# Patient Record
Sex: MALE | Race: WHITE | NOT HISPANIC OR LATINO | Employment: FULL TIME | URBAN - METROPOLITAN AREA
[De-identification: names, ages, dates, MRNs, and addresses within clinical notes are randomized per-mention and may not be internally consistent; named-entity substitution may affect disease eponyms.]

---

## 2023-09-21 ENCOUNTER — OFFICE VISIT (OUTPATIENT)
Dept: OTOLARYNGOLOGY | Facility: CLINIC | Age: 63
End: 2023-09-21
Payer: COMMERCIAL

## 2023-09-21 VITALS — WEIGHT: 209 LBS | HEIGHT: 71 IN | BODY MASS INDEX: 29.26 KG/M2

## 2023-09-21 DIAGNOSIS — H81.10 BPPV (BENIGN PAROXYSMAL POSITIONAL VERTIGO), UNSPECIFIED LATERALITY: ICD-10-CM

## 2023-09-21 DIAGNOSIS — J34.3 NASAL TURBINATE HYPERTROPHY: ICD-10-CM

## 2023-09-21 DIAGNOSIS — Z78.9 INTOLERANCE OF CONTINUOUS POSITIVE AIRWAY PRESSURE (CPAP) VENTILATION: ICD-10-CM

## 2023-09-21 DIAGNOSIS — G47.33 OSA (OBSTRUCTIVE SLEEP APNEA): Primary | ICD-10-CM

## 2023-09-21 DIAGNOSIS — R09.81 NASAL CONGESTION: ICD-10-CM

## 2023-09-21 PROCEDURE — 99204 OFFICE O/P NEW MOD 45 MIN: CPT | Mod: S$GLB,,, | Performed by: STUDENT IN AN ORGANIZED HEALTH CARE EDUCATION/TRAINING PROGRAM

## 2023-09-21 PROCEDURE — 3008F BODY MASS INDEX DOCD: CPT | Mod: CPTII,S$GLB,, | Performed by: STUDENT IN AN ORGANIZED HEALTH CARE EDUCATION/TRAINING PROGRAM

## 2023-09-21 PROCEDURE — 4010F ACE/ARB THERAPY RXD/TAKEN: CPT | Mod: CPTII,S$GLB,, | Performed by: STUDENT IN AN ORGANIZED HEALTH CARE EDUCATION/TRAINING PROGRAM

## 2023-09-21 PROCEDURE — 99204 PR OFFICE/OUTPT VISIT, NEW, LEVL IV, 45-59 MIN: ICD-10-PCS | Mod: S$GLB,,, | Performed by: STUDENT IN AN ORGANIZED HEALTH CARE EDUCATION/TRAINING PROGRAM

## 2023-09-21 PROCEDURE — 1159F PR MEDICATION LIST DOCUMENTED IN MEDICAL RECORD: ICD-10-PCS | Mod: CPTII,S$GLB,, | Performed by: STUDENT IN AN ORGANIZED HEALTH CARE EDUCATION/TRAINING PROGRAM

## 2023-09-21 PROCEDURE — 4010F PR ACE/ARB THEARPY RXD/TAKEN: ICD-10-PCS | Mod: CPTII,S$GLB,, | Performed by: STUDENT IN AN ORGANIZED HEALTH CARE EDUCATION/TRAINING PROGRAM

## 2023-09-21 PROCEDURE — 99999 PR PBB SHADOW E&M-NEW PATIENT-LVL IV: ICD-10-PCS | Mod: PBBFAC,,, | Performed by: STUDENT IN AN ORGANIZED HEALTH CARE EDUCATION/TRAINING PROGRAM

## 2023-09-21 PROCEDURE — 99999 PR PBB SHADOW E&M-NEW PATIENT-LVL IV: CPT | Mod: PBBFAC,,, | Performed by: STUDENT IN AN ORGANIZED HEALTH CARE EDUCATION/TRAINING PROGRAM

## 2023-09-21 PROCEDURE — 3008F PR BODY MASS INDEX (BMI) DOCUMENTED: ICD-10-PCS | Mod: CPTII,S$GLB,, | Performed by: STUDENT IN AN ORGANIZED HEALTH CARE EDUCATION/TRAINING PROGRAM

## 2023-09-21 PROCEDURE — 1159F MED LIST DOCD IN RCRD: CPT | Mod: CPTII,S$GLB,, | Performed by: STUDENT IN AN ORGANIZED HEALTH CARE EDUCATION/TRAINING PROGRAM

## 2023-09-21 RX ORDER — BLOOD-GLUCOSE METER
1 EACH MISCELLANEOUS
COMMUNITY
Start: 2023-09-15

## 2023-09-21 RX ORDER — MIRTAZAPINE 15 MG/1
15 TABLET, FILM COATED ORAL NIGHTLY
COMMUNITY
Start: 2023-08-20

## 2023-09-21 RX ORDER — ASPIRIN 81 MG/1
81 TABLET ORAL
COMMUNITY

## 2023-09-21 RX ORDER — FLUTICASONE PROPIONATE 50 MCG
1 SPRAY, SUSPENSION (ML) NASAL 2 TIMES DAILY
Qty: 16 G | Refills: 11 | Status: SHIPPED | OUTPATIENT
Start: 2023-09-21

## 2023-09-21 RX ORDER — ZOSTER VACCINE RECOMBINANT, ADJUVANTED 50 MCG/0.5
50 KIT INTRAMUSCULAR
COMMUNITY
Start: 2022-09-27

## 2023-09-21 RX ORDER — LANCETS 33 GAUGE
EACH MISCELLANEOUS
COMMUNITY
Start: 2023-09-18

## 2023-09-21 RX ORDER — SITAGLIPTIN AND METFORMIN HYDROCHLORIDE 1000; 50 MG/1; MG/1
1 TABLET, FILM COATED ORAL 2 TIMES DAILY
COMMUNITY
Start: 2023-09-06

## 2023-09-21 RX ORDER — GLIMEPIRIDE 4 MG/1
4 TABLET ORAL
COMMUNITY
Start: 2023-07-27

## 2023-09-21 RX ORDER — LOSARTAN POTASSIUM 25 MG/1
25 TABLET ORAL
COMMUNITY
Start: 2023-09-06

## 2023-09-21 RX ORDER — MONTELUKAST SODIUM 10 MG/1
10 TABLET ORAL
COMMUNITY
Start: 2023-09-06

## 2023-09-21 RX ORDER — AMITRIPTYLINE HYDROCHLORIDE 25 MG/1
25-50 TABLET, FILM COATED ORAL NIGHTLY
COMMUNITY
Start: 2023-08-20

## 2023-09-21 RX ORDER — LANCETS
EACH MISCELLANEOUS
COMMUNITY
Start: 2023-09-18

## 2023-09-21 RX ORDER — METRONIDAZOLE 500 MG/1
500 TABLET ORAL 2 TIMES DAILY
COMMUNITY
Start: 2023-05-10 | End: 2024-01-24 | Stop reason: ALTCHOICE

## 2023-09-21 NOTE — PROGRESS NOTES
Otolaryngology Clinic Note    Subjective:       Patient ID: Jostin Singleton is a 62 y.o. male.    Chief Complaint: inspire consult      History of Present Illness: Jostin Singleton is a 62 y.o. male presenting with IMELDA with CPAP intolerance. Has  tried nasal pillows, nasal mask and face, only mildly tolerated nasal pillows. Rolls around and will pull off. Not wearing at all. Does not like cleanliness concerns with machine.   Last study was more than 5 years ago. Travels a lot and does not like machine. No significant weight change, maybe 10 lbs less than prior.   Does have nasal congestion at baseline. Used to use flonase- 3 years total, not sure if it helped. Takes zyrtec and singulair. Used to have allergy issues, but not recent. No rhinorrhea, no pressure. Eyes burn after cutting grass. Not sure if he needs meds. Nose is very dry. Been using vicks. No ENT surgeries. Also reports BBPV to left, flaring lately. Last maybe 2 weeks ago.     ESS 13.       Past Surgical History:   Procedure Laterality Date    knee scope      TRIGGER FINGER RELEASE       Past Medical History:   Diagnosis Date    Diabetes mellitus     Dizziness     Hypertension      Social Determinants of Health     Tobacco Use: Unknown (9/21/2023)    Patient History     Smoking Tobacco Use: Never     Smokeless Tobacco Use: Unknown     Passive Exposure: Not on file   Alcohol Use: Not on file   Financial Resource Strain: Not on file   Food Insecurity: Not on file   Transportation Needs: Not on file   Physical Activity: Not on file   Stress: Not on file   Social Connections: Not on file   Housing Stability: Not on file   Depression: Not on file     Review of patient's allergies indicates:   Allergen Reactions    Codeine Nausea Only, Other (See Comments) and Nausea And Vomiting     sweats  Patient tolerated percocet     Current Outpatient Medications   Medication Instructions    alprazolam ODT (NIRAVAM) 0.5 MG TbDL No dose, route, or frequency recorded.     amitriptyline (ELAVIL) 25-50 mg, Oral, Nightly    aspirin (ECOTRIN) 81 mg, Oral    blood sugar diagnostic Strp 1 strip, Other    DOCOSAHEXAENOIC ACID ORAL 2 g, Oral    fluticasone propionate (FLONASE) 50 mcg, Each Nostril, 2 times daily    glimepiride (AMARYL) 4 mg, Oral    JANUMET 50-1,000 mg per tablet 1 tablet, Oral, 2 times daily    lancets Misc Check glucose daily. One Touch Verio Flex lancets.    losartan (COZAAR) 25 mg, Oral    metFORMIN (GLUCOPHAGE) 500 mg, Oral, 2 times daily    metoprolol succinate (TOPROL-XL) 25 mg, 2 times daily    metroNIDAZOLE (FLAGYL) 500 mg, Oral, 2 times daily    mirtazapine (REMERON) 15 mg, Oral, Nightly    montelukast (SINGULAIR) 10 mg, Oral    ONETOUCH DELICA PLUS LANCET 33 gauge Misc Topical (Top)    ONETOUCH VERIO TEST STRIPS Strp 1 strip    varicella-zoster [id number]-AS01B, PF, (SHINGRIX, PF,) 50 mcg/0.5 mL injection 50 mcg, Intramuscular         ENT ROS negative except as stated above.     Patient answers are not available for this visit.            Objective:      There were no vitals filed for this visit.    General: NAD, well appearing  Eyes: Normal conjunctiva and lids  Face: symmetric, nerve intact  Nose: The nose is without any evidence of any deformity. The nasal mucosa is moist. The septum is midline, thick crest on right. There is no evidence of septal hematoma. The turbinates are moderately large.   Ears: The ears are with normal-appearing pinna. Examination of the canals is normal appearing bilaterally. There is no drainage or erythema noted. The tympanic membranes are normal appearing with pearly color, normal-appearing landmarks and normal light reflex. Hearing is grossly intact.  Mouth: No obvious abnormalities to the lips. The teeth are unremarkable. The gingivae are without any obvious evidence of infection or lesion. The oral mucosa is moist and pink. There are no obvious masses to the hard or soft palate.   Oropharynx: The uvula is midline.  The tongue is midline.  The posterior pharynx is without erythema or exudate. The tonsils are normal appearing.  Salivary glands: The salivary glands are symmetric and not enlarged, no masses  Neck: No lymphadenopathy, trachea midline, thryoid not enlarged.  Psych: Normal mood and affect.   Neuro: Grossly intact  Speech: fluent  Monty hallpike- ? Mild to right, negative to left, did not happen on repeat.        Assessment and Plan:       1. IMELDA (obstructive sleep apnea)    2. Intolerance of continuous positive airway pressure (CPAP) ventilation    3. BPPV (benign paroxysmal positional vertigo), unspecified laterality    4. Nasal congestion    5. Nasal turbinate hypertrophy          Reviewed Inspire criteria. Meets weight. Needs new sleep study, HSAT ordered.   Will do DISE after with turbinate reduction. I discussed the risks of turbinate reduction including persistent issues, bleeding, smell changes, tearing abnormalities. Flonase for now.   Can stop singulair, can stop zyrtec if not helping.     Glucose high in AM, concerned for long term issues from untreated IMELDA.     BPPV - scott-daroff if recurs, or epley to side of concern.       RTC: will plan for DISE after HSAT    Plan of care was discussed in detail with the patient, who agreed with the plan as above. All questions were answered in detail.     Citlaly Nieto MD  Otolaryngology

## 2023-09-21 NOTE — PATIENT INSTRUCTIONS
Try lesvia or epley to side of concern if vertigo comes back.     I will contact after sleep study to plan for sleep endoscopy if sleep apnea is in the range for inspire.

## 2023-10-02 ENCOUNTER — PATIENT MESSAGE (OUTPATIENT)
Dept: OTOLARYNGOLOGY | Facility: CLINIC | Age: 63
End: 2023-10-02
Payer: COMMERCIAL

## 2023-10-02 ENCOUNTER — PATIENT MESSAGE (OUTPATIENT)
Dept: ADMINISTRATIVE | Facility: OTHER | Age: 63
End: 2023-10-02
Payer: COMMERCIAL

## 2023-10-05 ENCOUNTER — PATIENT MESSAGE (OUTPATIENT)
Dept: OTOLARYNGOLOGY | Facility: CLINIC | Age: 63
End: 2023-10-05
Payer: COMMERCIAL

## 2023-10-05 DIAGNOSIS — H81.10 BPPV (BENIGN PAROXYSMAL POSITIONAL VERTIGO), UNSPECIFIED LATERALITY: Primary | ICD-10-CM

## 2023-10-05 NOTE — TELEPHONE ENCOUNTER
I'm placing referral that can be sent to the place in [place]. I can also see him tomorrow to try to reposition if he would like or if he can tell which ear it is (was left prior I think), he can do epley at home and look up videos or instructions online.

## 2023-10-24 ENCOUNTER — PATIENT MESSAGE (OUTPATIENT)
Dept: OTOLARYNGOLOGY | Facility: CLINIC | Age: 63
End: 2023-10-24
Payer: COMMERCIAL

## 2023-11-13 ENCOUNTER — PATIENT MESSAGE (OUTPATIENT)
Dept: OTOLARYNGOLOGY | Facility: CLINIC | Age: 63
End: 2023-11-13
Payer: COMMERCIAL

## 2023-11-13 DIAGNOSIS — J34.3 NASAL TURBINATE HYPERTROPHY: ICD-10-CM

## 2023-11-13 DIAGNOSIS — R09.81 NASAL CONGESTION: ICD-10-CM

## 2023-11-13 DIAGNOSIS — G47.33 OSA (OBSTRUCTIVE SLEEP APNEA): Primary | ICD-10-CM

## 2023-11-13 DIAGNOSIS — Z78.9 INTOLERANCE OF CONTINUOUS POSITIVE AIRWAY PRESSURE (CPAP) VENTILATION: ICD-10-CM

## 2023-11-13 NOTE — TELEPHONE ENCOUNTER
Case request placed for dise and turb trim. He is out of town until mid dec. May book for then or next year. 2 week follow up for nose debridement. Thank you!

## 2023-12-11 ENCOUNTER — TELEPHONE (OUTPATIENT)
Dept: OTOLARYNGOLOGY | Facility: CLINIC | Age: 63
End: 2023-12-11
Payer: COMMERCIAL

## 2023-12-11 NOTE — TELEPHONE ENCOUNTER
----- Message from Jeanette Morris sent at 12/11/2023  2:11 PM CST -----  Regarding: Needs to reschedule  Type: Needs Medical Advice  Who Called:  Jostin Deng Call Back Number: [phone]    Additional Information: Pt was hoping to reschedule his procedure to 1/16 instead of 12/26 please earlene

## 2024-01-24 RX ORDER — MULTIVITAMIN
1 TABLET ORAL DAILY
COMMUNITY

## 2024-01-24 RX ORDER — ATORVASTATIN CALCIUM 20 MG/1
20 TABLET, FILM COATED ORAL DAILY
COMMUNITY

## 2024-01-24 RX ORDER — SEMAGLUTIDE 1.34 MG/ML
0.25 INJECTION, SOLUTION SUBCUTANEOUS
COMMUNITY

## 2024-01-26 ENCOUNTER — ANESTHESIA EVENT (OUTPATIENT)
Dept: SURGERY | Facility: HOSPITAL | Age: 64
End: 2024-01-26
Payer: COMMERCIAL

## 2024-01-29 ENCOUNTER — TELEPHONE (OUTPATIENT)
Dept: OTOLARYNGOLOGY | Facility: CLINIC | Age: 64
End: 2024-01-29
Payer: COMMERCIAL

## 2024-01-29 ENCOUNTER — HOSPITAL ENCOUNTER (OUTPATIENT)
Facility: HOSPITAL | Age: 64
Discharge: HOME | End: 2024-01-29
Attending: STUDENT IN AN ORGANIZED HEALTH CARE EDUCATION/TRAINING PROGRAM | Admitting: STUDENT IN AN ORGANIZED HEALTH CARE EDUCATION/TRAINING PROGRAM
Payer: COMMERCIAL

## 2024-01-29 ENCOUNTER — ANESTHESIA (OUTPATIENT)
Dept: SURGERY | Facility: HOSPITAL | Age: 64
End: 2024-01-29
Payer: COMMERCIAL

## 2024-01-29 VITALS
WEIGHT: 201 LBS | SYSTOLIC BLOOD PRESSURE: 176 MMHG | TEMPERATURE: 97 F | HEART RATE: 57 BPM | BODY MASS INDEX: 28.14 KG/M2 | DIASTOLIC BLOOD PRESSURE: 90 MMHG | HEIGHT: 71 IN | OXYGEN SATURATION: 97 % | RESPIRATION RATE: 11 BRPM

## 2024-01-29 DIAGNOSIS — R09.81 NASAL CONGESTION: ICD-10-CM

## 2024-01-29 DIAGNOSIS — Z78.9 INTOLERANCE OF CONTINUOUS POSITIVE AIRWAY PRESSURE (CPAP) VENTILATION: ICD-10-CM

## 2024-01-29 DIAGNOSIS — J34.3 NASAL TURBINATE HYPERTROPHY: ICD-10-CM

## 2024-01-29 DIAGNOSIS — G47.33 OSA (OBSTRUCTIVE SLEEP APNEA): Primary | ICD-10-CM

## 2024-01-29 LAB — GLUCOSE SERPL-MCNC: 227 MG/DL (ref 70–110)

## 2024-01-29 PROCEDURE — 63600175 PHARM REV CODE 636 W HCPCS: Mod: PO | Performed by: ANESTHESIOLOGY

## 2024-01-29 PROCEDURE — 30140 RESECT INFERIOR TURBINATE: CPT | Mod: 50,,, | Performed by: STUDENT IN AN ORGANIZED HEALTH CARE EDUCATION/TRAINING PROGRAM

## 2024-01-29 PROCEDURE — 25000003 PHARM REV CODE 250: Mod: PO | Performed by: STUDENT IN AN ORGANIZED HEALTH CARE EDUCATION/TRAINING PROGRAM

## 2024-01-29 PROCEDURE — 71000015 HC POSTOP RECOV 1ST HR: Mod: PO | Performed by: STUDENT IN AN ORGANIZED HEALTH CARE EDUCATION/TRAINING PROGRAM

## 2024-01-29 PROCEDURE — 27201423 OPTIME MED/SURG SUP & DEVICES STERILE SUPPLY: Mod: PO | Performed by: STUDENT IN AN ORGANIZED HEALTH CARE EDUCATION/TRAINING PROGRAM

## 2024-01-29 PROCEDURE — D9220A PRA ANESTHESIA: Mod: ANES,,, | Performed by: ANESTHESIOLOGY

## 2024-01-29 PROCEDURE — 25000003 PHARM REV CODE 250: Mod: PO | Performed by: ANESTHESIOLOGY

## 2024-01-29 PROCEDURE — 63600175 PHARM REV CODE 636 W HCPCS: Mod: PO | Performed by: NURSE ANESTHETIST, CERTIFIED REGISTERED

## 2024-01-29 PROCEDURE — 37000008 HC ANESTHESIA 1ST 15 MINUTES: Mod: PO | Performed by: STUDENT IN AN ORGANIZED HEALTH CARE EDUCATION/TRAINING PROGRAM

## 2024-01-29 PROCEDURE — 37000009 HC ANESTHESIA EA ADD 15 MINS: Mod: PO | Performed by: STUDENT IN AN ORGANIZED HEALTH CARE EDUCATION/TRAINING PROGRAM

## 2024-01-29 PROCEDURE — 36000709 HC OR TIME LEV III EA ADD 15 MIN: Mod: PO | Performed by: STUDENT IN AN ORGANIZED HEALTH CARE EDUCATION/TRAINING PROGRAM

## 2024-01-29 PROCEDURE — 25000003 PHARM REV CODE 250: Mod: PO | Performed by: NURSE ANESTHETIST, CERTIFIED REGISTERED

## 2024-01-29 PROCEDURE — 36000708 HC OR TIME LEV III 1ST 15 MIN: Mod: PO | Performed by: STUDENT IN AN ORGANIZED HEALTH CARE EDUCATION/TRAINING PROGRAM

## 2024-01-29 PROCEDURE — 63600175 PHARM REV CODE 636 W HCPCS: Mod: PO | Performed by: STUDENT IN AN ORGANIZED HEALTH CARE EDUCATION/TRAINING PROGRAM

## 2024-01-29 PROCEDURE — 82962 GLUCOSE BLOOD TEST: CPT | Mod: PO | Performed by: STUDENT IN AN ORGANIZED HEALTH CARE EDUCATION/TRAINING PROGRAM

## 2024-01-29 PROCEDURE — 42975 DISE EVAL SLP DO BRTH FLX DX: CPT | Mod: 51,,, | Performed by: STUDENT IN AN ORGANIZED HEALTH CARE EDUCATION/TRAINING PROGRAM

## 2024-01-29 PROCEDURE — D9220A PRA ANESTHESIA: Mod: CRNA,,, | Performed by: NURSE ANESTHETIST, CERTIFIED REGISTERED

## 2024-01-29 PROCEDURE — 71000033 HC RECOVERY, INTIAL HOUR: Mod: PO | Performed by: STUDENT IN AN ORGANIZED HEALTH CARE EDUCATION/TRAINING PROGRAM

## 2024-01-29 RX ORDER — OXYCODONE HYDROCHLORIDE 5 MG/1
5 TABLET ORAL ONCE AS NEEDED
Status: ACTIVE | OUTPATIENT
Start: 2024-01-29 | End: 2035-06-27

## 2024-01-29 RX ORDER — ONDANSETRON 4 MG/1
4 TABLET, ORALLY DISINTEGRATING ORAL EVERY 6 HOURS PRN
Qty: 10 TABLET | Refills: 0 | Status: SHIPPED | OUTPATIENT
Start: 2024-01-29

## 2024-01-29 RX ORDER — OXYMETAZOLINE HCL 0.05 %
SPRAY, NON-AEROSOL (ML) NASAL
Status: DISCONTINUED | OUTPATIENT
Start: 2024-01-29 | End: 2024-01-29 | Stop reason: HOSPADM

## 2024-01-29 RX ORDER — LIDOCAINE HYDROCHLORIDE AND EPINEPHRINE 10; 10 MG/ML; UG/ML
INJECTION, SOLUTION INFILTRATION; PERINEURAL
Status: DISCONTINUED | OUTPATIENT
Start: 2024-01-29 | End: 2024-01-29 | Stop reason: HOSPADM

## 2024-01-29 RX ORDER — HYDROCODONE BITARTRATE AND ACETAMINOPHEN 5; 325 MG/1; MG/1
1 TABLET ORAL EVERY 6 HOURS PRN
Qty: 10 TABLET | Refills: 0 | Status: SHIPPED | OUTPATIENT
Start: 2024-01-29 | End: 2024-02-01

## 2024-01-29 RX ORDER — MIDAZOLAM HYDROCHLORIDE 1 MG/ML
INJECTION, SOLUTION INTRAMUSCULAR; INTRAVENOUS
Status: DISCONTINUED | OUTPATIENT
Start: 2024-01-29 | End: 2024-01-29

## 2024-01-29 RX ORDER — FENTANYL CITRATE 50 UG/ML
INJECTION, SOLUTION INTRAMUSCULAR; INTRAVENOUS
Status: DISCONTINUED | OUTPATIENT
Start: 2024-01-29 | End: 2024-01-29

## 2024-01-29 RX ORDER — OXYMETAZOLINE HCL 0.05 %
2 SPRAY, NON-AEROSOL (ML) NASAL
Status: COMPLETED | OUTPATIENT
Start: 2024-01-29 | End: 2024-01-29

## 2024-01-29 RX ORDER — FENTANYL CITRATE 50 UG/ML
25 INJECTION, SOLUTION INTRAMUSCULAR; INTRAVENOUS EVERY 5 MIN PRN
Status: ACTIVE | OUTPATIENT
Start: 2024-01-29

## 2024-01-29 RX ORDER — MUPIROCIN 20 MG/G
OINTMENT TOPICAL
Status: DISCONTINUED | OUTPATIENT
Start: 2024-01-29 | End: 2024-01-29 | Stop reason: HOSPADM

## 2024-01-29 RX ORDER — PROPOFOL 10 MG/ML
VIAL (ML) INTRAVENOUS CONTINUOUS PRN
Status: DISCONTINUED | OUTPATIENT
Start: 2024-01-29 | End: 2024-01-29

## 2024-01-29 RX ORDER — LIDOCAINE HYDROCHLORIDE 10 MG/ML
1 INJECTION, SOLUTION EPIDURAL; INFILTRATION; INTRACAUDAL; PERINEURAL ONCE
Status: COMPLETED | OUTPATIENT
Start: 2024-01-29 | End: 2024-01-29

## 2024-01-29 RX ORDER — EPINEPHRINE 1 MG/ML
INJECTION INTRAMUSCULAR; INTRAVENOUS; SUBCUTANEOUS
Status: DISCONTINUED | OUTPATIENT
Start: 2024-01-29 | End: 2024-01-29 | Stop reason: HOSPADM

## 2024-01-29 RX ORDER — METOCLOPRAMIDE HYDROCHLORIDE 5 MG/ML
10 INJECTION INTRAMUSCULAR; INTRAVENOUS EVERY 10 MIN PRN
Status: ACTIVE | OUTPATIENT
Start: 2024-01-29

## 2024-01-29 RX ORDER — LIDOCAINE HYDROCHLORIDE 20 MG/ML
INJECTION INTRAVENOUS
Status: DISCONTINUED | OUTPATIENT
Start: 2024-01-29 | End: 2024-01-29

## 2024-01-29 RX ORDER — ROCURONIUM BROMIDE 10 MG/ML
INJECTION, SOLUTION INTRAVENOUS
Status: DISCONTINUED | OUTPATIENT
Start: 2024-01-29 | End: 2024-01-29

## 2024-01-29 RX ORDER — KETAMINE HCL IN 0.9 % NACL 50 MG/5 ML
SYRINGE (ML) INTRAVENOUS
Status: DISCONTINUED | OUTPATIENT
Start: 2024-01-29 | End: 2024-01-29

## 2024-01-29 RX ORDER — DEXAMETHASONE SODIUM PHOSPHATE 4 MG/ML
INJECTION, SOLUTION INTRA-ARTICULAR; INTRALESIONAL; INTRAMUSCULAR; INTRAVENOUS; SOFT TISSUE
Status: DISCONTINUED | OUTPATIENT
Start: 2024-01-29 | End: 2024-01-29

## 2024-01-29 RX ORDER — SODIUM CHLORIDE 0.9 % (FLUSH) 0.9 %
3 SYRINGE (ML) INJECTION
Status: SHIPPED | OUTPATIENT
Start: 2024-01-29

## 2024-01-29 RX ORDER — LIDOCAINE HYDROCHLORIDE 40 MG/ML
INJECTION, SOLUTION RETROBULBAR
Status: DISCONTINUED | OUTPATIENT
Start: 2024-01-29 | End: 2024-01-29 | Stop reason: HOSPADM

## 2024-01-29 RX ORDER — ACETAMINOPHEN 10 MG/ML
INJECTION, SOLUTION INTRAVENOUS
Status: DISCONTINUED | OUTPATIENT
Start: 2024-01-29 | End: 2024-01-29

## 2024-01-29 RX ORDER — PROPOFOL 10 MG/ML
VIAL (ML) INTRAVENOUS
Status: DISCONTINUED | OUTPATIENT
Start: 2024-01-29 | End: 2024-01-29

## 2024-01-29 RX ORDER — SODIUM CHLORIDE, SODIUM LACTATE, POTASSIUM CHLORIDE, CALCIUM CHLORIDE 600; 310; 30; 20 MG/100ML; MG/100ML; MG/100ML; MG/100ML
INJECTION, SOLUTION INTRAVENOUS CONTINUOUS
Status: DISPENSED | OUTPATIENT
Start: 2024-01-29

## 2024-01-29 RX ADMIN — GLYCOPYRROLATE 0.2 MG: 0.2 INJECTION INTRAMUSCULAR; INTRAVENOUS at 07:01

## 2024-01-29 RX ADMIN — PROPOFOL 125 MCG/KG/MIN: 10 INJECTION, EMULSION INTRAVENOUS at 07:01

## 2024-01-29 RX ADMIN — PROPOFOL 10 MG: 10 INJECTION, EMULSION INTRAVENOUS at 07:01

## 2024-01-29 RX ADMIN — ACETAMINOPHEN 1000 MG: 10 INJECTION, SOLUTION INTRAVENOUS at 07:01

## 2024-01-29 RX ADMIN — MIDAZOLAM HYDROCHLORIDE 1 MG: 1 INJECTION, SOLUTION INTRAMUSCULAR; INTRAVENOUS at 08:01

## 2024-01-29 RX ADMIN — Medication 2 SPRAY: at 07:01

## 2024-01-29 RX ADMIN — ROCURONIUM BROMIDE 30 MG: 10 INJECTION, SOLUTION INTRAVENOUS at 07:01

## 2024-01-29 RX ADMIN — LIDOCAINE HYDROCHLORIDE 1 MG: 10 INJECTION, SOLUTION EPIDURAL; INFILTRATION; INTRACAUDAL; PERINEURAL at 07:01

## 2024-01-29 RX ADMIN — SUGAMMADEX 200 MG: 100 INJECTION, SOLUTION INTRAVENOUS at 08:01

## 2024-01-29 RX ADMIN — LIDOCAINE HYDROCHLORIDE 30 MG: 20 INJECTION INTRAVENOUS at 07:01

## 2024-01-29 RX ADMIN — FENTANYL CITRATE 50 MCG: 0.05 INJECTION, SOLUTION INTRAMUSCULAR; INTRAVENOUS at 07:01

## 2024-01-29 RX ADMIN — DEXAMETHASONE SODIUM PHOSPHATE 4 MG: 4 INJECTION, SOLUTION INTRAMUSCULAR; INTRAVENOUS at 08:01

## 2024-01-29 RX ADMIN — Medication 20 MG: at 07:01

## 2024-01-29 RX ADMIN — PROPOFOL 30 MG: 10 INJECTION, EMULSION INTRAVENOUS at 07:01

## 2024-01-29 RX ADMIN — SODIUM CHLORIDE, POTASSIUM CHLORIDE, SODIUM LACTATE AND CALCIUM CHLORIDE: 600; 310; 30; 20 INJECTION, SOLUTION INTRAVENOUS at 07:01

## 2024-01-29 NOTE — PLAN OF CARE
VSS, all questions answered. Denies recent fever or illness. Dr. Munoz notified of pts Blood sugar of 227. Pt states ready for procedure.

## 2024-01-29 NOTE — ANESTHESIA PREPROCEDURE EVALUATION
01/29/2024  Jostin Singleton is a 63 y.o., male.      Pre-op Assessment    I have reviewed the Patient Summary Reports.     I have reviewed the Nursing Notes. I have reviewed the NPO Status.   I have reviewed the Medications.     Review of Systems  Anesthesia Hx:  No problems with previous Anesthesia                Social:  Non-Smoker       Cardiovascular:     Hypertension, well controlled                                        Pulmonary:        Sleep Apnea                Renal/:  Renal/ Normal                 Neurological:  Neurology Normal                                      Endocrine:  Diabetes, well controlled, type 2               Physical Exam  General: Well nourished, Cooperative, Alert and Oriented    Airway:  Mallampati: II   Mouth Opening: Normal  TM Distance: Normal  Neck ROM: Normal ROM    Anesthesia Plan  Type of Anesthesia, risks & benefits discussed:    Anesthesia Type: Gen ETT, Gen Supraglottic Airway, Gen Natural Airway, MAC  Intra-op Monitoring Plan: Standard ASA Monitors  Post Op Pain Control Plan: multimodal analgesia  Induction:  IV  Airway Plan: Direct, Video and Fiberoptic, Post-Induction  Informed Consent: Informed consent signed with the Patient and all parties understand the risks and agree with anesthesia plan.  All questions answered.   ASA Score: 3    Ready For Surgery From Anesthesia Perspective.   .

## 2024-01-29 NOTE — TRANSFER OF CARE
"Anesthesia Transfer of Care Note    Patient: Jostin Singleton    Procedure(s) Performed: Procedure(s) (LRB):  SLEEP ENDOSCOPY,DRUG-INDUCED (Bilateral)  REDUCTION, NASAL TURBINATE (Bilateral)    Patient location: PACU    Anesthesia Type: general    Transport from OR: Transported from OR on room air with adequate spontaneous ventilation    Post pain: adequate analgesia    Post assessment: no apparent anesthetic complications and tolerated procedure well    Post vital signs: stable    Level of consciousness: responds to stimulation    Nausea/Vomiting: no nausea/vomiting    Complications: none    Transfer of care protocol was followed      Last vitals: Visit Vitals  BP (!) 173/95 (BP Location: Left arm, Patient Position: Sitting)   Pulse 72   Temp 36.2 °C (97.1 °F) (Skin)   Resp 13   Ht 5' 11" (1.803 m)   Wt 91.2 kg (201 lb)   SpO2 99%   BMI 28.03 kg/m²     "

## 2024-01-29 NOTE — TELEPHONE ENCOUNTER
S/w wife and she states that the pt is allergic to Norco and it is causing him to vomit. Advised wife to have pt d/c Norco and only take Tylenol and Ibuprofen. She verbalized understanding. Advised wife that pt was also given Zofran to help with nausea/vomiting. Wife states pt took them together, advised that he should take Zofran and then wait 20-30 minutes before trying Norco again, if he feels he needs the Norco, she verbalized understanding.

## 2024-01-29 NOTE — ANESTHESIA PROCEDURE NOTES
Intubation    Date/Time: 1/29/2024 7:53 AM    Performed by: Blanquita Carrasco CRNA  Authorized by: Jostin Munoz MD    Intubation:     Induction:  Intravenous    Intubated:  Postinduction    Mask Ventilation:  Easy mask    Attempted By:  CRNA    Method of Intubation:  Video laryngoscopy    Blade:  Burkett 3    Laryngeal View Grade: Grade I - full view of cords      Difficult Airway Encountered?: No      Complications:  None    Airway Device Size:  7.0    Style/Cuff Inflation:  Cuffed (inflated to minimal occlusive pressure)    Inflation Amount (mL):  5    Tube secured:  22    Secured at:  The lips    Placement Verified By:  Capnometry    Complicating Factors:  None    Findings Post-Intubation:  BS equal bilateral and atraumatic/condition of teeth unchanged

## 2024-01-29 NOTE — DISCHARGE INSTRUCTIONS
Post-op Sleep Surgery (Turbinate Reduction)   Citlaly Nieto MD  Otolaryngology - [facility] - [phone]    WHAT TO DO    1. You should follow-up in 2 weeks. My staff will reach out to book this. They will also reach out to schedule you for the inspire surgery.   Please call [phone] to set up a specific time or to change dates.  Call me ASAP if you are experiencing any unexpected problems.  2. Use the recommended medications / treatments as described  3. Familiarize yourself with the information in this pamphlet  4. Call me with questions.       Pain and Activity  You will have ear pain and sore throat for 1-2 weeks. This commonly increased between days 5-7 following surgery as the scabs heal up.  You will have headache and nasal pain. This should generally improve over the first week  Light activity / no strenuous exercise for 2 weeks    Diet  Make sure to get enough fluids and nutrients. Food and drink guidelines include:  Take lots of fluids. Good choices are water, popsicles, and mild juices. Hydration is the MOST IMPORTANT factor in your nutrition during the healing process. Other examples include Boost, Ensure, and electrolyte containing juices)  No diet restrictions. You may want to eat more of a modified diet (with softer foods) but I am fine with you eating anything in your normal diet.  You may want to avoid spicy/acidic and hard foods during this time, strictly for comfort.     Medication  Give only medications approved by your doctor. Follow directions closely when giving your child medications.  Pain medication  You should alternate the pain medications so that you are taking one medication up to every 3 hours. An example is Ibuprofen, then Oxycodone 3 hours later.   Hydrocodone with Acetaminophen - this can be taken every 6 hours as needed for pain. It is usually needed for the first few days regularly. Do not take plain Tylenol (acetaminophen) within 6 hours of this  medication.   You should also take an anti-inflammatory for pain in addition to the narcotic, including Ibuprofen/Advil/Motrin. You can take 800 mg every 6 hours as needed for pain.    Mupirocin - this is a nasal antibiotic that should be applied to the nostrils twice daily for the first 10 days.   In the first few days, it is recommend to give something every 6 hours while you are awake to keep the pain down. You can alternate Motrin and Tylenol OR Motrin and the Oxycodone.     Medication Tips  Avoid fish oil (omega acids) and vitamin E for 1 week.  As your pain lessens, you may Replace doses of prescription pain medications with acetaminophen (Tylenol). However, Do Not take doses of prescription pain medications at the same time as Tylenol because this could cause an overdose of Tylenol.  Do Not drive or operate machinery if taking prescription pain medications  Read each medication label carefully, ask your pharmacist if you have any questions regarding warnings on the label  Do not measure liquid with a kitchen spoon. There are pediatric measuring devices available at the pharmacy. Ask for one when you get your prescription filled.  Store all mediations out of reach of children    Nasal Care:  It is common to experience nasal congestion after surgery. This is due to swelling and scabs. DO NOT BLOW YOUR NOSE until 2 weeks after surgery.  Crusts are essentially scabs and mucus that build up in the nasal passages after surgery.  Crusts eventually resolve. Usually, I will remove some crusts during your postoperative visit.  Nasal saline spray or irrigation helps to soften and remove scabs. Start saline or irrigation NOW  You can use regular spray at least 3 times a day or rinses as below.     Nasal irrigation kits are available over the counter in the nasal section. Common brands include SinuCleanse or NeilMed.     Nasal Irrigations  This will help remove the allergens, debris, and mucous from your nose to help you  breathe. It will also clear it in preparation for other nasal medications.    To perform, purchase an over the counter sinus irrigation kit such as the NeilMed Sinus Rinse Kit. Use as directed on the box. You should use distilled water or water that was previously boiled and left to cool. If you wish, you may make your own solution. However, salt packets are available in the nasal section in your  drug store.     A rough estimate for making salt solution is:  8oz water  2 teaspoons salt (pickling, carlos or Kosher salt)  1 teaspoon baking soda    After each use, rinse the bottle with small amount of rubbing alcohol and clean with soap.  Replace the irrigation bottle if it becomes visibly soiled or every few weeks.      When to Call the Doctor  Mild pain and a slight fever are normal after surgery. But call the doctor right if you have any of the following:  Fever: temperature greater than 101  Trouble breathing  Bright red bleeding  Any other concerns         PLEASE PERFORM SINUS RINSES 3-4 TIMES DAILY UNTIL YOUR NEXT VISIT.          DIRECTIONS FOR SINUS SALINE RINSE To see demonstration: Enter http://www.RateItAll.com/watch?v=[email or web address] into the browser address box, or go to You tube, and under the search box, enter sinus rinse. Click on NeilMed Sinus Rinse Video    Step 1    Step 1 Please wash your hands. Fill the clean bottle with the designated volume of warm distilled water, filtered water or previously boiled water. You may warm the water in a microwave but we recommend that you warm it in increments of five seconds. This is to avoid excessive heating of the water and damage to the device or scalding your nasal passage.    Step 2    Step 2 Cut the SINUS RINSE mixture packet at the corner and pour its contents into the bottle. Tighten the cap & tube on the bottle securely, place one finger over the tip of the cap and shake the bottle gently to dissolve the mixture.      Step 3    Step 3 Standing in front  of a sink, bend forward to your comfort level and tilt your head down. Keeping your mouth open without holding your breath, place cap snugly against your nasal passage and SQUEEZE BOTTLE GENTLY until the solution starts draining from the OPPOSITE nasal passage or from your mouth. Keep squeezing the bottle GENTLY until at least 1/4 to 1/2 (60 to 120 mL) of the bottle is used for a proper rinse. Do not swallow the solution.    Step 4    Blow your nose gently, without pinching your nose completely because this will apply pressure on the eardrums. If tolerable, sniff in any residual solution remaining in the nasal passage once or twice prior to blowing your nose as this may clean out the posterior nasopharyngeal area (the area at the back of your nasal passage). Some solution will reach the back of the throat, so please spit it out. To help improve drainage of any residual solution, blow your nose gently while tilting your head to the opposite side of the nasal passage that you just rinsed.    Step 5    Now repeat steps 3 & 4 for your other nasal passage.    Step 6     Air dry the Sinus Rinse bottle, cap, and tube on a clean paper towel, a glass plate to store the bottle cap and tube. If there is any solution leftover, please discard it. We recommend you make a fresh solution each time you rinse. Rinse 5 times each day, OR as directed by your physician. Warnings: DO NOT RINSE IF NASAL PASSAGE IS COMPLETELY BLOCKED OR IF YOU HAVE AN EAR INFECTION OR BLOCKED EARS. If you have had ear surgery, please contact your physician prior to irrigation. If you experience any pressure in your ears, stop the rinse and get further directions from your physician or contact our office during regular business hours. To avoid any ear discomfort: Heat the solution to lukewarm, do not use hot, boiling or cold water. Keep your mouth open. Do not hold your breath and if possible make the sound RANDY....RANDY... Make sure to take the position as  shown. Gently squeeze 1/4 of the bottle at a time (60mL / 2 ounces). Stop the rinse if you feel any solution sensation near the ears. You may rinse with a partially blocked nasal passage. Please do not use for any other purposes. Please rinse at least ONE HOUR PRIOR to bedtime, in order to avoid any residual solution dripping in the throat.    >> Before using the SINUS RINSE kit, please inspect the cap, tube and bottle carefully for wear and tear. If any of the components appear discolored or cracked, please contact [employer] to obtain a replacement. You must follow the cleaning instructions provided in this brochure or cleaning instruction card prior to each use.    >> The SINUS RINSE bottle and mixture are to be used only for nasalirrigation. Do not use for any other purposes.    >> We recommend that you use the rinse ONE HOUR PRIOR to bedtime in order to avoid any residual solution dripping in the throat.    Tips to avoid ear discomfort while rinsing    If you have had ear surgery, please contact your physician prior to irrigation. Do not use if you have an ear infection or blocked ears. Rinse with lukewarm water. Keep your mouth open. Do not hold your breath while rinsing. While rinsing, make sure to tilt your. Gently squeeze the bottle while rinsing; do not squeeze the bottle very forcefully. Stop the rinse if you feel a sensation of fluid near your ears.    Tips to avoid unexpected drainage after rinsing    In rare situations, especially if you have had sinus surgery, the saline solution can pool in the sinus cavities and nasal passages and then drip from your nostrils hours after rinsing. To avoid this harmless but annoying inconvenience, take one extra step after rinsing: lean forward, tilt your head sideways and gently blow your nose. Then, tilt your head to the other side and blow again. You may need to repeat this several times. This will help rid your nasal passages of any excess mucus and remaining  saline solution. If you find yourself experiencing delayed drainage often, do not rinse right before leaving your house or going to bed.

## 2024-01-29 NOTE — OP NOTE
Otolaryngology- Head & Neck Surgery  Operative Report    Jostin Singleton  [id number]  1960    Date of surgery: 1/29/2024    Preoperative Diagnosis:   IMELDA (obstructive sleep apnea) [G47.33]  Intolerance of continuous positive airway pressure (CPAP) ventilation [Z78.9]  Nasal congestion [R09.81]  Nasal turbinate hypertrophy [J34.3]    Postoperative Diagnosis:    Same    Procedure:   1. Drug induced sleep endoscopy  2. Bilateral inferior turbinate reduction    Attending:  Citlaly Nieto MD    Assist: none    Anesthesia: General     EBL: < 5 ml    Complications: None    Findings:  -     Nasal septum:  bilateral spur at anterior maxillary crest with mild/moderate narrowing at this area    -     Inferior turbinate: hypertrophy or edema (Severe) bilaterally  -     With snoring or modified mullers:    Velopharynx collapse: anterior    Tongue Base collapse: severe    Other sites of obstruction: none  -     Lingual tonsil has moderate hypertrophy  -     Larynx mucosa is normal    Posterior commissure has no hypertrophy    no vocal fold immobility    mass/lesion: none  -     Other findings:  he is an excellent inspire candidate based on his pattern of collapse today      Specimen: none    Disposition: Stable, to PACU    Preoperative Indication:   Jostin Singleton is a 63 y.o. male who has been noted to have the above issues, AHI 18. 3, here for sleep endoscopy and turbinate reduction to assess for hypoglossal nerve stimulator candidacy and improve nasal congestion.       Description of Procedure:  Patient was brought to the operating room and placed on the table in supine position.  A time out was performed.    After adequate anesthesia was obtained, the flexible fiberoptic scope was passed into each nostril independently, with the patient in the supine position.  Each nasal cavity, the entire pharynx (nasopharynx to hypopharynx) and the larynx were visualized. At the end of the examination, the scope was removed.  Anesthesia was  then obtained via endotracheal tube. 1% 1:100,000 lidocaine with epinephrine was injected into each turbinate and epinephrine soaked pledgets were placed in each nare.      The Medtronic 4 mm turbinate blade was used to to remove mucosa, submucosa and conchal bone from the left then right inferior turbinate until there was significant reduction of the tissue and a patent nasal passageway. Suction bovie was used for cautery. Harrison was used to lateralize the turbinate. The nasal cavity was suctioned free of blood and saline and found to be hemostatic.     Nose was irrigated and oropharynx was suctioned.     At the end of the procedure, the patient was awakened from anesthesia and transferred to the PACU in good condition.      Citlaly Nieto MD  Otolaryngology Attending

## 2024-01-29 NOTE — H&P
Otolaryngology Clinic Note    Subjective:       Patient ID: Jostin Singleton is a 63 y.o. male.    Chief Complaint: No chief complaint on file.      History of Present Illness: Jostin Singleton is a 63 y.o. male presenting with IMELDA with CPAP intolerance. Has  tried nasal pillows, nasal mask and face, only mildly tolerated nasal pillows. Rolls around and will pull off. Not wearing at all. Does not like cleanliness concerns with machine.   Last study was more than 5 years ago. Travels a lot and does not like machine. No significant weight change, maybe 10 lbs less than prior.   Does have nasal congestion at baseline. Used to use flonase- 3 years total, not sure if it helped. Takes zyrtec and singulair. Used to have allergy issues, but not recent. No rhinorrhea, no pressure. Eyes burn after cutting grass. Not sure if he needs meds. Nose is very dry. Been using vicks. No ENT surgeries. Also reports BBPV to left, flaring lately. Last maybe 2 weeks ago.     ESS 13.     HSAT:   AHI 18.3            Past Surgical History:   Procedure Laterality Date    knee scope      OPEN REDUCTION AND INTERNAL FIXATION (ORIF) OF INJURY OF ANKLE Left     TRIGGER FINGER RELEASE       Past Medical History:   Diagnosis Date    Diabetes mellitus     type 2    Dizziness     Hypertension     Sleep apnea      Social Determinants of Health     Tobacco Use: High Risk (1/24/2024)    Patient History     Smoking Tobacco Use: Never     Smokeless Tobacco Use: Current     Passive Exposure: Not on file   Alcohol Use: Not on file   Financial Resource Strain: Not on file   Food Insecurity: Not on file   Transportation Needs: Not on file   Physical Activity: Not on file   Stress: Not on file   Social Connections: Not on file   Housing Stability: Not on file   Depression: Not on file     Review of patient's allergies indicates:   Allergen Reactions    Codeine Nausea Only, Other (See Comments) and Nausea And Vomiting     sweats  Patient tolerated percocet     Current  Outpatient Medications   Medication Instructions    alprazolam ODT (NIRAVAM) 0.5 mg, Oral, Nightly PRN    amitriptyline (ELAVIL) 25-50 mg, Oral, Nightly    APPLE CIDER VINEGAR ORAL 1 tablet, Oral, Daily    aspirin (ECOTRIN) 81 mg, Oral    atorvastatin (LIPITOR) 20 mg, Oral, Daily    blood sugar diagnostic Strp 1 strip, Other    cyanocobalamin (vitamin B-12) (VITAMIN B-12) 50 mcg, Oral, Daily    DOCOSAHEXAENOIC ACID ORAL 2 g, Oral    fluticasone propionate (FLONASE) 50 mcg, Each Nostril, 2 times daily    GARLIC ORAL 1 tablet, Oral, Daily    glimepiride (AMARYL) 4 mg, Oral    JANUMET 50-1,000 mg per tablet 1 tablet, Oral, 2 times daily    lancets Misc Check glucose daily. One Touch Verio Flex lancets.    losartan (COZAAR) 25 mg, Oral    metFORMIN (GLUCOPHAGE) 500 mg, Oral, 2 times daily    metoprolol succinate (TOPROL-XL) 25 mg, 2 times daily    mirtazapine (REMERON) 15 mg, Oral, Nightly    montelukast (SINGULAIR) 10 mg, Oral    multivitamin (THERAGRAN) per tablet 1 tablet, Oral, Daily    ONETOUCH DELICA PLUS LANCET 33 gauge Misc Topical (Top)    ONETOUCH VERIO TEST STRIPS Strp 1 strip    OZEMPIC 0.25 mg, Subcutaneous, Every 7 days    varicella-zoster gE-AS01B, PF, (SHINGRIX, PF,) 50 mcg/0.5 mL injection 50 mcg, Intramuscular         ENT ROS negative except as stated above.     Patient answers are not available for this visit.            Objective:      There were no vitals filed for this visit.    General: NAD, well appearing  Eyes: Normal conjunctiva and lids  Face: symmetric, nerve intact  Nose: The nose is without any evidence of any deformity. The nasal mucosa is moist. The septum is midline, thick crest on right. There is no evidence of septal hematoma. The turbinates are moderately large.   Ears: The ears are with normal-appearing pinna. Examination of the canals is normal appearing bilaterally. There is no drainage or erythema noted. The tympanic membranes are normal appearing with pearly color,  normal-appearing landmarks and normal light reflex. Hearing is grossly intact.  Mouth: No obvious abnormalities to the lips. The teeth are unremarkable. The gingivae are without any obvious evidence of infection or lesion. The oral mucosa is moist and pink. There are no obvious masses to the hard or soft palate.   Oropharynx: The uvula is midline.  The tongue is midline. The posterior pharynx is without erythema or exudate. The tonsils are normal appearing.  Salivary glands: The salivary glands are symmetric and not enlarged, no masses  Neck: No lymphadenopathy, trachea midline, thryoid not enlarged.  Psych: Normal mood and affect.   Neuro: Grossly intact  Speech: fluent  Cardio: RRR  Respiratory: No increased WOB  Abdomen: soft, NT, ND       Assessment and Plan:       IMELDA, CPAP intolerance, turbinate hypertrophy          DISE with turbinate reduction. I discussed the risks of turbinate reduction including persistent issues, bleeding, smell changes, tearing abnormalities. Flonase for now.   Can stop singulair, can stop zyrtec if not helping.     Glucose high in AM, concerned for long term issues from untreated IMELDA.       Plan of care was discussed in detail with the patient, who agreed with the plan as above. All questions were answered in detail.     Citlaly Nieto MD  Otolaryngology

## 2024-01-29 NOTE — TELEPHONE ENCOUNTER
----- Message from Sole Vicente sent at 1/29/2024  3:45 PM CST -----  Contact: WifeMasha  Type:  Needs Medical Advice    Who Called:   WifeMasha    Pharmacy name and phone #:        [id number] - [place] - [address]  [place] [address]  Phone: [phone] Fax: [phone]    Would the patient rather a call back or a response via [facility]?   Call back  Best Call Back Number:   [phone]    Additional Information:   States she would like to speak with someone - states they mentioned he is allergic to codeine and was given HYDROcodone-acetaminophen (NORCO) 5-325 mg per tablet, which is causing him to vomit, which in turn is causing him to bleed through his nose - states he is only able to take PERCOCET (not NORCO) - please call to discuss/advise - thank you

## 2024-01-29 NOTE — TRANSFER OF CARE
"Anesthesia Transfer of Care Note    Patient: Jostin Singleton    Procedure(s) Performed: Procedure(s) (LRB):  SLEEP ENDOSCOPY,DRUG-INDUCED (Bilateral)  REDUCTION, NASAL TURBINATE (Bilateral)    Patient location: PACU    Anesthesia Type: general    Transport from OR: Transported from OR on room air with adequate spontaneous ventilation    Post pain: adequate analgesia    Post assessment: no apparent anesthetic complications and tolerated procedure well    Post vital signs: stable    Level of consciousness: awake, alert and oriented    Nausea/Vomiting: no nausea/vomiting    Complications: none    Transfer of care protocol was followed      Last vitals: Visit Vitals  BP (!) 170/83   Pulse (!) 59   Temp 36.2 °C (97.1 °F) (Skin)   Resp 16   Ht 5' 11" (1.803 m)   Wt 91.2 kg (201 lb)   SpO2 97%   BMI 28.03 kg/m²     "

## 2024-01-29 NOTE — DISCHARGE SUMMARY
[facility] - Surgery  Discharge Note  Short Stay    Procedure(s) (LRB):  SLEEP ENDOSCOPY,DRUG-INDUCED (Bilateral)  REDUCTION, NASAL TURBINATE (Bilateral)      OUTCOME: Patient tolerated treatment/procedure well without complication and is now ready for discharge.    DISPOSITION: Home or Self Care    FINAL DIAGNOSIS:  <principal problem not specified>    FOLLOWUP: In clinic    DISCHARGE INSTRUCTIONS:  No discharge procedures on file.     TIME SPENT ON DISCHARGE: 10 minutes

## 2024-01-29 NOTE — ANESTHESIA POSTPROCEDURE EVALUATION
Anesthesia Post Evaluation    Patient: Jostin Singleton    Procedure(s) Performed: Procedure(s) (LRB):  SLEEP ENDOSCOPY,DRUG-INDUCED (Bilateral)  REDUCTION, NASAL TURBINATE (Bilateral)    Final Anesthesia Type: general      Patient location during evaluation: PACU  Patient participation: Yes- Able to Participate  Level of consciousness: awake and alert and oriented  Post-procedure vital signs: reviewed and stable  Pain management: adequate  Airway patency: patent    PONV status at discharge: No PONV  Anesthetic complications: no      Cardiovascular status: blood pressure returned to baseline and stable  Respiratory status: unassisted and spontaneous ventilation  Hydration status: euvolemic  Follow-up not needed.              Vitals Value Taken Time   /90 01/29/24 0907   Temp   01/29/24 0920   Pulse 57 01/29/24 0907   Resp 11 01/29/24 0907   SpO2 97 % 01/29/24 0907         Event Time   Out of Recovery 09:09:56         Pain/Skip Score: Skip Score: 10 (1/29/2024  9:04 AM)

## 2024-01-30 ENCOUNTER — TELEPHONE (OUTPATIENT)
Dept: OTOLARYNGOLOGY | Facility: CLINIC | Age: 64
End: 2024-01-30
Payer: COMMERCIAL

## 2024-01-30 NOTE — TELEPHONE ENCOUNTER
----- Message from Citlaly Nieto MD sent at 1/29/2024  8:26 AM CST -----  Regarding: post op and inspire  Please book for 2-3 week post op visit turb reduction and I am placing case request for inspire, hold off until 3 weeks post op from today's surgery. Thanks!

## 2024-02-14 ENCOUNTER — OFFICE VISIT (OUTPATIENT)
Dept: OTOLARYNGOLOGY | Facility: CLINIC | Age: 64
End: 2024-02-14
Payer: COMMERCIAL

## 2024-02-14 VITALS — HEIGHT: 71 IN | WEIGHT: 196 LBS | BODY MASS INDEX: 27.44 KG/M2

## 2024-02-14 DIAGNOSIS — Z78.9 INTOLERANCE OF CONTINUOUS POSITIVE AIRWAY PRESSURE (CPAP) VENTILATION: ICD-10-CM

## 2024-02-14 DIAGNOSIS — J34.3 NASAL TURBINATE HYPERTROPHY: ICD-10-CM

## 2024-02-14 DIAGNOSIS — G47.33 OSA (OBSTRUCTIVE SLEEP APNEA): Primary | ICD-10-CM

## 2024-02-14 DIAGNOSIS — Z98.890 HISTORY OF SUBMUCOUS NASAL SURGERY: ICD-10-CM

## 2024-02-14 PROCEDURE — 4010F ACE/ARB THERAPY RXD/TAKEN: CPT | Mod: CPTII,S$GLB,, | Performed by: STUDENT IN AN ORGANIZED HEALTH CARE EDUCATION/TRAINING PROGRAM

## 2024-02-14 PROCEDURE — 31237 NSL/SINS NDSC SURG BX POLYPC: CPT | Mod: 50,S$GLB,, | Performed by: STUDENT IN AN ORGANIZED HEALTH CARE EDUCATION/TRAINING PROGRAM

## 2024-02-14 PROCEDURE — 99024 POSTOP FOLLOW-UP VISIT: CPT | Mod: S$GLB,,, | Performed by: STUDENT IN AN ORGANIZED HEALTH CARE EDUCATION/TRAINING PROGRAM

## 2024-02-14 PROCEDURE — 99999 PR PBB SHADOW E&M-EST. PATIENT-LVL IV: CPT | Mod: PBBFAC,,, | Performed by: STUDENT IN AN ORGANIZED HEALTH CARE EDUCATION/TRAINING PROGRAM

## 2024-02-14 PROCEDURE — 1159F MED LIST DOCD IN RCRD: CPT | Mod: CPTII,S$GLB,, | Performed by: STUDENT IN AN ORGANIZED HEALTH CARE EDUCATION/TRAINING PROGRAM

## 2024-02-14 NOTE — PROGRESS NOTES
Otolaryngology Clinic Note    Subjective:       Patient ID: Jostin Singleton is a 62 y.o. male.    Chief Complaint: inspire consult      History of Present Illness: Jostin Singleton is a 62 y.o. male presenting with IMELDA with CPAP intolerance. Has  tried nasal pillows, nasal mask and face, only mildly tolerated nasal pillows. Rolls around and will pull off. Not wearing at all. Does not like cleanliness concerns with machine.   Last study was more than 5 years ago. Travels a lot and does not like machine. No significant weight change, maybe 10 lbs less than prior.   Does have nasal congestion at baseline. Used to use flonase- 3 years total, not sure if it helped. Takes zyrtec and singulair. Used to have allergy issues, but not recent. No rhinorrhea, no pressure. Eyes burn after cutting grass. Not sure if he needs meds. Nose is very dry. Been using vicks. No ENT surgeries. Also reports BBPV to left, flaring lately. Last maybe 2 weeks ago.     ESS 13.     2/14/24: RTC post op dise/turb and to book inspire. He had some pain day 1, some bleeding day 5 but no other issues. Using saline.       Past Surgical History:   Procedure Laterality Date    knee scope      TRIGGER FINGER RELEASE       Past Medical History:   Diagnosis Date    Diabetes mellitus     Dizziness     Hypertension      Social Determinants of Health     Tobacco Use: Unknown (9/21/2023)    Patient History     Smoking Tobacco Use: Never     Smokeless Tobacco Use: Unknown     Passive Exposure: Not on file   Alcohol Use: Not on file   Financial Resource Strain: Not on file   Food Insecurity: Not on file   Transportation Needs: Not on file   Physical Activity: Not on file   Stress: Not on file   Social Connections: Not on file   Housing Stability: Not on file   Depression: Not on file     Review of patient's allergies indicates:   Allergen Reactions    Codeine Nausea Only, Other (See Comments) and Nausea And Vomiting     sweats  Patient tolerated percocet     Current  Outpatient Medications   Medication Instructions    alprazolam ODT (NIRAVAM) 0.5 MG TbDL No dose, route, or frequency recorded.    amitriptyline (ELAVIL) 25-50 mg, Oral, Nightly    aspirin (ECOTRIN) 81 mg, Oral    blood sugar diagnostic Strp 1 strip, Other    DOCOSAHEXAENOIC ACID ORAL 2 g, Oral    fluticasone propionate (FLONASE) 50 mcg, Each Nostril, 2 times daily    glimepiride (AMARYL) 4 mg, Oral    JANUMET 50-1,000 mg per tablet 1 tablet, Oral, 2 times daily    lancets Misc Check glucose daily. One Touch Verio Flex lancets.    losartan (COZAAR) 25 mg, Oral    metFORMIN (GLUCOPHAGE) 500 mg, Oral, 2 times daily    metoprolol succinate (TOPROL-XL) 25 mg, 2 times daily    metroNIDAZOLE (FLAGYL) 500 mg, Oral, 2 times daily    mirtazapine (REMERON) 15 mg, Oral, Nightly    montelukast (SINGULAIR) 10 mg, Oral    ONETOUCH DELICA PLUS LANCET 33 gauge Misc Topical (Top)    ONETOUCH VERIO TEST STRIPS Strp 1 strip    varicella-zoster gE-AS01B, PF, (SHINGRIX, PF,) 50 mcg/0.5 mL injection 50 mcg, Intramuscular         ENT ROS negative except as stated above.     Patient answers are not available for this visit.            Objective:      There were no vitals filed for this visit.    General: NAD, well appearing  Eyes: Normal conjunctiva and lids  Face: symmetric, nerve intact  Nose: The nose is without any evidence of any deformity. The nasal mucosa is moist. The septum is midline, thick crest on right. Scabbing along inferior turbinates, reduced size  Ears: The ears are with normal-appearing pinna. Examination of the canals is normal appearing bilaterally. There is no drainage or erythema noted. The tympanic membranes are normal appearing with pearly color, normal-appearing landmarks and normal light reflex. Hearing is grossly intact.  Mouth: No obvious abnormalities to the lips. The teeth are unremarkable. The gingivae are without any obvious evidence of infection or lesion. The oral mucosa is moist and pink. There are no  obvious masses to the hard or soft palate.   Oropharynx: The uvula is midline.  The tongue is midline. The posterior pharynx is without erythema or exudate. The tonsils are normal appearing.  Salivary glands: The salivary glands are symmetric and not enlarged, no masses  Neck: No lymphadenopathy, trachea midline, thryoid not enlarged.  Psych: Normal mood and affect.   Neuro: Grossly intact  Speech: fluent    Nasal Endoscopy with Debridement    Nasal endoscopy with debridement was performed in accordance with the 0 day global period for endoscopic sinus surgery and are unrelated to any other recently performed procedures.     Topical Agents: Topical 0.25% phenylephrine and 4% lidocaine    A 0 degree rigid nasal endoscope was inserted into the nose to visualize the nasal passageway and paranasal sinuses. Under endoscopic visualization, a combination of instruments including suction and grasping forceps were used to debride crust, debris, inflammatory tissue and nasal polyps from the nasal cavity, paranasal sinuses and sinus drainage pathways. This was performed to aid in healing and optimize the patency and function of the sinus cavities and nasal passageways. This is necessary to avoid scar formation, infection, and mucocele formation. In addition, this facilitates in the optimal instillation of topical therapies, saline irrigations, long-term disease surveillance, and endoscopically-derived cultures. Examination of the inferior turbinates, middle turbinates, middle meatus, operated sinuses, sphenoethmoidal recess, and nasopharynx was undertaken. All findings were normal for this stage in healing and included:    LEFT: scabbing removed from length of inferior turbinate, difficult to remove and left part of scab on, everything else normal.   RIGHT: scabbing removed from length of inferior turbinate, everything else normal       Assessment and Plan:       1. IMELDA (obstructive sleep apnea)    2. Intolerance of continuous  positive airway pressure (CPAP) ventilation    3. BPPV (benign paroxysmal positional vertigo), unspecified laterality    4. Nasal congestion    5. Nasal turbinate hypertrophy          Reviewed Inspire criteria. Ahi 18.3, DISE complete and meets pattern of collapse. Book for inspire.     Turbinates reduced well and cleaned today. Continue saline rinses 2-3 times a day until follow up    Glucose high in AM, concerned for long term issues from untreated IMELDA.     I discussed the risks of hypoglossal nerve stimulation including: scar, bleeding, numbness of incision, numbness or weakness of tongue or marginal mandibular nerve, irritation of tongue from stimulation, implant failure, inadequate improvement, need for further procedures, need for removal of implant, infection, pneumothorax, MRI incompatibility.       RTC:for inspire then 1 week post op and will recheck nose then    Plan of care was discussed in detail with the patient, who agreed with the plan as above. All questions were answered in detail.     Citlaly Nieto MD  Otolaryngology

## 2024-02-23 ENCOUNTER — PATIENT MESSAGE (OUTPATIENT)
Dept: OTOLARYNGOLOGY | Facility: CLINIC | Age: 64
End: 2024-02-23
Payer: COMMERCIAL

## 2024-02-27 ENCOUNTER — OFFICE VISIT (OUTPATIENT)
Dept: OTOLARYNGOLOGY | Facility: CLINIC | Age: 64
End: 2024-02-27
Payer: COMMERCIAL

## 2024-02-27 VITALS — HEIGHT: 71 IN | WEIGHT: 194 LBS | BODY MASS INDEX: 27.16 KG/M2

## 2024-02-27 DIAGNOSIS — G47.33 OSA (OBSTRUCTIVE SLEEP APNEA): ICD-10-CM

## 2024-02-27 DIAGNOSIS — Z78.9 INTOLERANCE OF CONTINUOUS POSITIVE AIRWAY PRESSURE (CPAP) VENTILATION: ICD-10-CM

## 2024-02-27 DIAGNOSIS — Z98.890 HISTORY OF SUBMUCOUS NASAL SURGERY: Primary | ICD-10-CM

## 2024-02-27 PROCEDURE — 99999 PR PBB SHADOW E&M-EST. PATIENT-LVL IV: CPT | Mod: PBBFAC,,, | Performed by: STUDENT IN AN ORGANIZED HEALTH CARE EDUCATION/TRAINING PROGRAM

## 2024-02-27 PROCEDURE — 3008F BODY MASS INDEX DOCD: CPT | Mod: CPTII,S$GLB,, | Performed by: STUDENT IN AN ORGANIZED HEALTH CARE EDUCATION/TRAINING PROGRAM

## 2024-02-27 PROCEDURE — 99213 OFFICE O/P EST LOW 20 MIN: CPT | Mod: S$GLB,,, | Performed by: STUDENT IN AN ORGANIZED HEALTH CARE EDUCATION/TRAINING PROGRAM

## 2024-02-27 PROCEDURE — 4010F ACE/ARB THERAPY RXD/TAKEN: CPT | Mod: CPTII,S$GLB,, | Performed by: STUDENT IN AN ORGANIZED HEALTH CARE EDUCATION/TRAINING PROGRAM

## 2024-02-27 PROCEDURE — 1159F MED LIST DOCD IN RCRD: CPT | Mod: CPTII,S$GLB,, | Performed by: STUDENT IN AN ORGANIZED HEALTH CARE EDUCATION/TRAINING PROGRAM

## 2024-02-27 RX ORDER — BLOOD-GLUCOSE SENSOR
EACH MISCELLANEOUS
COMMUNITY
Start: 2024-01-31

## 2024-02-27 NOTE — PROGRESS NOTES
Otolaryngology Clinic Note    Subjective:       Patient ID: Jostin Singleton is a 62 y.o. male.    Chief Complaint:sleep apnea      History of Present Illness: Jostin Singleton is a 62 y.o. male presenting with IMELDA with CPAP intolerance. Has  tried nasal pillows, nasal mask and face, only mildly tolerated nasal pillows. Rolls around and will pull off. Not wearing at all. Does not like cleanliness concerns with machine.   Last study was more than 5 years ago. Travels a lot and does not like machine. No significant weight change, maybe 10 lbs less than prior.   Does have nasal congestion at baseline. Used to use flonase- 3 years total, not sure if it helped. Takes zyrtec and singulair. Used to have allergy issues, but not recent. No rhinorrhea, no pressure. Eyes burn after cutting grass. Not sure if he needs meds. Nose is very dry. Been using vicks. No ENT surgeries. Also reports BBPV to left, flaring lately. Last maybe 2 weeks ago.     ESS 13.     2/14/24: RTC post op dise/turb and to book inspire. He had some pain day 1, some bleeding day 5 but no other issues. Using saline.     2/27/24: Nose feels good. Has been using saline. Scab seems to have fallen off. Chunk came off. No concerns. He is sleeping better with nasal surgery. Has gone from 220 at sleep study to 194 right now. Worried about inspire surgery. Feels better rested.       Past Surgical History:   Procedure Laterality Date    knee scope      TRIGGER FINGER RELEASE       Past Medical History:   Diagnosis Date    Diabetes mellitus     Dizziness     Hypertension      Social Determinants of Health     Tobacco Use: Unknown (9/21/2023)    Patient History     Smoking Tobacco Use: Never     Smokeless Tobacco Use: Unknown     Passive Exposure: Not on file   Alcohol Use: Not on file   Financial Resource Strain: Not on file   Food Insecurity: Not on file   Transportation Needs: Not on file   Physical Activity: Not on file   Stress: Not on file   Social Connections: Not on  file   Housing Stability: Not on file   Depression: Not on file     Review of patient's allergies indicates:   Allergen Reactions    Codeine Nausea Only, Other (See Comments) and Nausea And Vomiting     sweats  Patient tolerated percocet     Current Outpatient Medications   Medication Instructions    alprazolam ODT (NIRAVAM) 0.5 MG TbDL No dose, route, or frequency recorded.    amitriptyline (ELAVIL) 25-50 mg, Oral, Nightly    aspirin (ECOTRIN) 81 mg, Oral    blood sugar diagnostic Strp 1 strip, Other    DOCOSAHEXAENOIC ACID ORAL 2 g, Oral    fluticasone propionate (FLONASE) 50 mcg, Each Nostril, 2 times daily    glimepiride (AMARYL) 4 mg, Oral    JANUMET 50-1,000 mg per tablet 1 tablet, Oral, 2 times daily    lancets Misc Check glucose daily. One Touch Verio Flex lancets.    losartan (COZAAR) 25 mg, Oral    metFORMIN (GLUCOPHAGE) 500 mg, Oral, 2 times daily    metoprolol succinate (TOPROL-XL) 25 mg, 2 times daily    metroNIDAZOLE (FLAGYL) 500 mg, Oral, 2 times daily    mirtazapine (REMERON) 15 mg, Oral, Nightly    montelukast (SINGULAIR) 10 mg, Oral    ONETOUCH DELICA PLUS LANCET 33 gauge Misc Topical (Top)    ONETOUCH VERIO TEST STRIPS Strp 1 strip    varicella-zoster gE-AS01B, PF, (SHINGRIX, PF,) 50 mcg/0.5 mL injection 50 mcg, Intramuscular         ENT ROS negative except as stated above.     Patient answers are not available for this visit.            Objective:      There were no vitals filed for this visit.    General: NAD, well appearing  Eyes: Normal conjunctiva and lids  Face: symmetric, nerve intact  Nose: The nose is without any evidence of any deformity. The nasal mucosa is moist. The septum is midline, spur anterior on right. Turbinates are reduced and healing well.   Ears: The ears are with normal-appearing pinna. Examination of the canals is normal appearing bilaterally. There is no drainage or erythema noted. The tympanic membranes are normal appearing with pearly color, normal-appearing landmarks  and normal light reflex. Hearing is grossly intact.  Mouth: No obvious abnormalities to the lips. The teeth are unremarkable. The gingivae are without any obvious evidence of infection or lesion. The oral mucosa is moist and pink. There are no obvious masses to the hard or soft palate.   Oropharynx: The uvula is midline.  The tongue is midline. The posterior pharynx is without erythema or exudate. The tonsils are normal appearing.  Salivary glands: The salivary glands are symmetric and not enlarged, no masses  Neck: No lymphadenopathy, trachea midline, thryoid not enlarged.  Psych: Normal mood and affect.   Neuro: Grossly intact  Speech: fluent           Assessment and Plan:       1. IMELDA (obstructive sleep apnea)    2. Intolerance of continuous positive airway pressure (CPAP) ventilation    3. BPPV (benign paroxysmal positional vertigo), unspecified laterality    4. Nasal congestion    5. Nasal turbinate hypertrophy          Reviewed Inspire criteria. Ahi 18.3, DISE complete and meets pattern of collapse.   He reports 25 lbs weight loss, sleeping better, breathing well at night. Snoring is not better per wife but he sleeps in recliner a lot. He wants to cancel inspire.     Turbinates reduced well.     I discussed the risks of hypoglossal nerve stimulation including: scar, bleeding, numbness of incision, numbness or weakness of tongue or marginal mandibular nerve, irritation of tongue from stimulation, implant failure, inadequate improvement, need for further procedures, need for removal of implant, infection, pneumothorax, MRI incompatibility.     He would like to cancel inspire surgery for now. Has lost 25 lbs. Likely helping with sleep apnea, also nose is more open. Will cancel surgery and see how his sleep does. Would do new sleep study later if his weight continues to be reduced.     RTC: PRN    Plan of care was discussed in detail with the patient, who agreed with the plan as above. All questions were answered  in detail.     Citlaly Nieto MD  Otolaryngology         Alert and oriented, no focal deficits, no motor or sensory deficits.

## (undated) DEVICE — COVER PROXIMA MAYO STAND

## (undated) DEVICE — Device

## (undated) DEVICE — GLOVE SURGICAL LATEX SZ 7

## (undated) DEVICE — KIT SAHARA DRAPE DRAW/LIFT

## (undated) DEVICE — NDL SPINAL 25GX3.5 SPINOCAN

## (undated) DEVICE — SPONGE PATTY SURGICAL .5X3IN

## (undated) DEVICE — KIT ANTIFOG

## (undated) DEVICE — TUBING SUC UNIV W/CONN 12FT

## (undated) DEVICE — SEE L#120831

## (undated) DEVICE — STRAP OR TABLE 5IN X 72IN

## (undated) DEVICE — SYR 10CC LUER LOCK

## (undated) DEVICE — ELECTRODE REM PLYHSV RETURN 9

## (undated) DEVICE — SOL 0.9% NACL IRRI.IN STERIL

## (undated) DEVICE — SPONGE GAUZE 16PLY 4X4

## (undated) DEVICE — NDL HYPO 27G X 1 1/2

## (undated) DEVICE — SUCTION COAGULATOR 10FR 6IN

## (undated) DEVICE — SPLINT NASAL AIRWAY SEPTAL SIL

## (undated) DEVICE — NEPTUNE 4 PORT MANIFOLD

## (undated) DEVICE — DRAPE HALF SURGICAL 40X58IN

## (undated) DEVICE — DRAPE EENT SPLIT STERILE

## (undated) DEVICE — BLADE TRICUT 3.5MM

## (undated) DEVICE — SUT ETHILON 2-0 BLK MONO PS

## (undated) DEVICE — SYR 3CC LUER LOC